# Patient Record
Sex: MALE | Race: WHITE | NOT HISPANIC OR LATINO | Employment: FULL TIME | URBAN - METROPOLITAN AREA
[De-identification: names, ages, dates, MRNs, and addresses within clinical notes are randomized per-mention and may not be internally consistent; named-entity substitution may affect disease eponyms.]

---

## 2017-10-10 ENCOUNTER — OFFICE VISIT (OUTPATIENT)
Dept: URGENT CARE | Facility: CLINIC | Age: 52
End: 2017-10-10
Payer: COMMERCIAL

## 2017-10-10 VITALS
RESPIRATION RATE: 24 BRPM | BODY MASS INDEX: 24.48 KG/M2 | DIASTOLIC BLOOD PRESSURE: 82 MMHG | SYSTOLIC BLOOD PRESSURE: 122 MMHG | OXYGEN SATURATION: 93 % | TEMPERATURE: 98.3 F | HEART RATE: 67 BPM | HEIGHT: 67 IN | WEIGHT: 156 LBS

## 2017-10-10 DIAGNOSIS — J06.9 UPPER RESPIRATORY TRACT INFECTION, UNSPECIFIED TYPE: ICD-10-CM

## 2017-10-10 PROCEDURE — 99203 OFFICE O/P NEW LOW 30 MIN: CPT | Performed by: PHYSICIAN ASSISTANT

## 2017-10-10 RX ORDER — AZITHROMYCIN 250 MG/1
TABLET, FILM COATED ORAL
Qty: 6 TAB | Refills: 0 | Status: SHIPPED | OUTPATIENT
Start: 2017-10-10 | End: 2020-06-20

## 2017-10-10 RX ORDER — ALBUTEROL SULFATE 90 UG/1
2 AEROSOL, METERED RESPIRATORY (INHALATION) EVERY 6 HOURS PRN
Qty: 8.5 G | Refills: 0 | Status: SHIPPED | OUTPATIENT
Start: 2017-10-10 | End: 2024-03-08

## 2017-10-10 RX ORDER — BENZONATATE 100 MG/1
100 CAPSULE ORAL 3 TIMES DAILY PRN
Qty: 60 CAP | Refills: 0 | Status: SHIPPED | OUTPATIENT
Start: 2017-10-10 | End: 2020-06-20

## 2017-10-10 RX ORDER — ALBUTEROL SULFATE 2.5 MG/3ML
2.5 SOLUTION RESPIRATORY (INHALATION) EVERY 4 HOURS PRN
Qty: 30 BULLET | Refills: 0 | Status: SHIPPED | OUTPATIENT
Start: 2017-10-10 | End: 2024-03-08

## 2017-10-10 ASSESSMENT — ENCOUNTER SYMPTOMS
HEMOPTYSIS: 0
MUSCULOSKELETAL NEGATIVE: 1
FEVER: 0
SPUTUM PRODUCTION: 1
DIZZINESS: 0
HEADACHES: 0
NAUSEA: 0
DIARRHEA: 0
WHEEZING: 1
SORE THROAT: 0
VOMITING: 0
ABDOMINAL PAIN: 0
COUGH: 1
CHILLS: 0
SHORTNESS OF BREATH: 0

## 2017-10-10 ASSESSMENT — COPD QUESTIONNAIRES: COPD: 0

## 2017-10-10 NOTE — PROGRESS NOTES
"Subjective:      Oj Lewis is a 52 y.o. male who presents with Cough and Wheezing            Cough   This is a new problem. The current episode started 1 to 4 weeks ago (2 weeks). The problem has been unchanged. The problem occurs every few minutes. The cough is productive of sputum. Associated symptoms include wheezing. Pertinent negatives include no chest pain, chills, ear pain, fever, headaches, hemoptysis, sore throat or shortness of breath. The symptoms are aggravated by lying down. He has tried nothing for the symptoms. His past medical history is significant for asthma and pneumonia. There is no history of COPD.   Wheezing    Associated symptoms include coughing and sputum production. Pertinent negatives include no abdominal pain, chest pain, chills, diarrhea, ear pain, fever, headaches, hemoptysis, shortness of breath, sore throat or vomiting. His past medical history is significant for asthma and pneumonia. There is no history of COPD.   Patient has been using his nebulizer at home, with some relief of wheezing symptoms. No recent use of antibiotics within the past 3 months. No known sick contacts.     Review of Systems   Constitutional: Negative for chills and fever.   HENT: Negative for ear pain and sore throat.    Respiratory: Positive for cough, sputum production and wheezing. Negative for hemoptysis and shortness of breath.    Cardiovascular: Negative for chest pain.   Gastrointestinal: Negative for abdominal pain, diarrhea, nausea and vomiting.   Genitourinary: Negative.    Musculoskeletal: Negative.    Neurological: Negative for dizziness and headaches.        Objective:     /82   Pulse 67   Temp 36.8 °C (98.3 °F)   Resp (!) 24   Ht 1.702 m (5' 7\")   Wt 70.8 kg (156 lb)   SpO2 93%   BMI 24.43 kg/m²      Physical Exam   Constitutional: He is oriented to person, place, and time. He appears well-developed and well-nourished. No distress.   HENT:   Head: Normocephalic and " atraumatic.   Right Ear: Hearing, tympanic membrane, external ear and ear canal normal.   Left Ear: Hearing, tympanic membrane, external ear and ear canal normal.   Mouth/Throat: Oropharynx is clear and moist. No oropharyngeal exudate, posterior oropharyngeal edema or posterior oropharyngeal erythema.   Eyes: Pupils are equal, round, and reactive to light.   Neck: Normal range of motion.   Cardiovascular: Normal rate, regular rhythm and normal heart sounds.    No murmur heard.  Pulmonary/Chest: Effort normal and breath sounds normal. No respiratory distress. He has no wheezes. He has no rales.   Musculoskeletal: Normal range of motion.   Neurological: He is alert and oriented to person, place, and time.   Skin: Skin is warm and dry. He is not diaphoretic.   Psychiatric: He has a normal mood and affect. His behavior is normal.   Nursing note and vitals reviewed.         PMH:  has no past medical history on file.  MEDS:   Current Outpatient Prescriptions:   •  azithromycin (ZITHROMAX) 250 MG Tab, Take 2 tablets on day one, then 1 tablet on days two through five, Disp: 6 Tab, Rfl: 0  •  benzonatate (TESSALON) 100 MG Cap, Take 1 Cap by mouth 3 times a day as needed for Cough., Disp: 60 Cap, Rfl: 0  •  albuterol (PROVENTIL) 2.5mg/3ml Nebu Soln solution for nebulization, 3 mL by Nebulization route every four hours as needed for Shortness of Breath., Disp: 30 Bullet, Rfl: 0  •  albuterol 108 (90 Base) MCG/ACT Aero Soln inhalation aerosol, Inhale 2 Puffs by mouth every 6 hours as needed for Shortness of Breath., Disp: 8.5 g, Rfl: 0  •  Mometasone Furo-Formoterol Fum (DULERA) 200-5 MCG/ACT AERO, Inhale 1 Puff by mouth 2 Times a Day., Disp: , Rfl:   •  cetirizine (ZYRTEC) 10 MG TABS, Take 10 mg by mouth every day., Disp: , Rfl:   •  aspirin (ASA) 325 MG TABS, Take 1 Tab by mouth every day., Disp: 100 Tab, Rfl: 0  ALLERGIES: No Known Allergies  SURGHX: No past surgical history on file.  SOCHX:  reports that he has never  smoked. He has never used smokeless tobacco. He reports that he does not drink alcohol or use drugs.  FH: family history is not on file.       Assessment/Plan:     1. Upper respiratory tract infection, unspecified type  - azithromycin (ZITHROMAX) 250 MG Tab; Take 2 tablets on day one, then 1 tablet on days two through five  Dispense: 6 Tab; Refill: 0   - Complete full course of antibiotics as prescribed   - benzonatate (TESSALON) 100 MG Cap; Take 1 Cap by mouth 3 times a day as needed for Cough.  Dispense: 60 Cap; Refill: 0  - albuterol (PROVENTIL) 2.5mg/3ml Nebu Soln solution for nebulization; 3 mL by Nebulization route every four hours as needed for Shortness of Breath.  Dispense: 30 Bullet; Refill: 0  - albuterol 108 (90 Base) MCG/ACT Aero Soln inhalation aerosol; Inhale 2 Puffs by mouth every 6 hours as needed for Shortness of Breath.  Dispense: 8.5 g; Refill: 0    Call or return to office if symptoms persist or worsen, would recommend CXR at that time.The patient demonstrated a good understanding and agreed with the treatment plan.

## 2017-10-20 ENCOUNTER — OFFICE VISIT (OUTPATIENT)
Dept: URGENT CARE | Facility: PHYSICIAN GROUP | Age: 52
End: 2017-10-20
Payer: COMMERCIAL

## 2017-10-20 VITALS
DIASTOLIC BLOOD PRESSURE: 80 MMHG | WEIGHT: 156 LBS | BODY MASS INDEX: 24.48 KG/M2 | HEART RATE: 68 BPM | RESPIRATION RATE: 16 BRPM | OXYGEN SATURATION: 97 % | SYSTOLIC BLOOD PRESSURE: 116 MMHG | TEMPERATURE: 98.5 F | HEIGHT: 67 IN

## 2017-10-20 DIAGNOSIS — J45.901 MODERATE ASTHMA WITH EXACERBATION, UNSPECIFIED WHETHER PERSISTENT: ICD-10-CM

## 2017-10-20 PROCEDURE — 99214 OFFICE O/P EST MOD 30 MIN: CPT | Performed by: PHYSICIAN ASSISTANT

## 2017-10-20 RX ORDER — METHYLPREDNISOLONE 4 MG/1
4 TABLET ORAL DAILY
Qty: 1 KIT | Refills: 0 | Status: SHIPPED | OUTPATIENT
Start: 2017-10-20 | End: 2020-06-20

## 2017-10-20 ASSESSMENT — ENCOUNTER SYMPTOMS
CONSTITUTIONAL NEGATIVE: 1
PSYCHIATRIC NEGATIVE: 1
SORE THROAT: 0
WHEEZING: 1
HEADACHES: 0
DIZZINESS: 0
SHORTNESS OF BREATH: 1
COUGH: 1
CARDIOVASCULAR NEGATIVE: 1
GASTROINTESTINAL NEGATIVE: 1
MUSCULOSKELETAL NEGATIVE: 1
EYES NEGATIVE: 1

## 2017-10-20 NOTE — PATIENT INSTRUCTIONS
Start steroid taper.  Qvar steroid inhaler once done, as needed.  Continue with albuterol inhaler and nebulizer.  Lots of fluids.  Humidifier at bedtime.  Don med sinus rinse or netti pot.

## 2017-10-20 NOTE — PROGRESS NOTES
Subjective:      Oj Lewis is a 52 y.o. male who presents with Cough (onset 3 weeks )            HPI  Chief Complaint   Patient presents with   • Cough     onset 3 weeks        HPI:  Oj Lewis is a 52 y.o. male who presents with cough x 3 weeks.  Was tx with azithromycin 10 days ago.   Nebulizer is helping. Cough is worsening throughout the day.  Hx of asthma.  Has taken Singulair in past.  Also had steroid inhaler in past. Feeling wheezy.  Non productive cough.  No sinus drainage at this time, azithromycin helped. Patient denies HA,  chest pain, palpitations, fever, chills, or n/v/d.      History reviewed. No pertinent past medical history.    History reviewed. No pertinent surgical history.    History reviewed. No pertinent family history.  No pertinent family history.    Social History     Social History   • Marital status:      Spouse name: N/A   • Number of children: N/A   • Years of education: N/A     Occupational History   • Not on file.     Social History Main Topics   • Smoking status: Never Smoker   • Smokeless tobacco: Never Used   • Alcohol use No   • Drug use: No   • Sexual activity: Not on file     Other Topics Concern   • Not on file     Social History Narrative   • No narrative on file         Current Outpatient Prescriptions:   •  albuterol, 2.5 mg, Nebulization, Q4HRS PRN  •  albuterol, 2 Puff, Inhalation, Q6HRS PRN  •  cetirizine, 10 mg, Oral, DAILY  •  azithromycin, Take 2 tablets on day one, then 1 tablet on days two through five  •  benzonatate, 100 mg, Oral, TID PRN  •  Mometasone Furo-Formoterol Fum, 1 Puff, Inhalation, BID, 8/29/2013 at Unknown  •  aspirin, 325 mg, Oral, DAILY    No Known Allergies     Review of Systems   Constitutional: Negative.    HENT: Positive for congestion. Negative for sore throat.    Eyes: Negative.    Respiratory: Positive for cough, shortness of breath and wheezing.    Cardiovascular: Negative.    Gastrointestinal: Negative.   "  Genitourinary: Negative.    Musculoskeletal: Negative.    Skin: Negative.    Neurological: Negative for dizziness and headaches.   Endo/Heme/Allergies: Negative.    Psychiatric/Behavioral: Negative.           Objective:     /80   Pulse 68   Temp 36.9 °C (98.5 °F)   Resp 16   Ht 1.702 m (5' 7\")   Wt 70.8 kg (156 lb)   SpO2 97%   BMI 24.43 kg/m²      Physical Exam       Nursing note and vitals reviewed.    Constitutional:   Appropriately groomed, pleasant affect, well nourished, in NAD.    Head:   Normocephalic, atraumatic.    Eyes:   PERRLA, EOM's full, sclera white, conjunctiva not erythematous, and medial canthus without exudate bilaterally.    Ears:  Auricle and tragus non-tender to manipulation.  No pre-auricular lymphadenopathy or mastoid ttp.  EACs with mild cerumen bilaterally, not erythematous.  TM’s pearly gray with cone of light present and umbo and malleolus visible bilaterally.  No bulging or fluid bubbles present in middle ear.  Hearing grossly intact to voice.    Nose:  Nares not patent bilaterally.  Nasal mucosa edematous with white rhinorrhea bilaterally.  Mild sinus tenderness to percussion.    Throat:  Dentition wnl, mucosa moist without lesions.  Oropharynx mildly erythematous, with no enlargement of the palatine tonsils bilaterally with no exudates.    Post nasal drainage  present.  Soft palate rises symmetrically bilaterally and uvula midline.      Neck: Neck supple, with mild anterior lymphadenopathy that is soft and mobile to palpation. Thyroid non-palpable without tenderness or nodules. No supraclavicular lymphadenopathy.    Lungs:  Respiratory effort not labored without accessory muscle use.  Lungs with inspiratory wheezes to auscultation. No rales. Rhonchi cleared with cough.    Heart:  RRR, without murmurs rubs or gallops.  Radial and dorsalis pedis pulse 2+ bilaterally.  No LE edema.    Musculoskeletal:  Gait non-antalgic with a narrow base.    Derm:  Skin without rashes or " lesions with good turgor pressure.      Psychiatric:  Mood, affect, and judgement appropriate.         Assessment/Plan:     1. Moderate asthma with exacerbation, unspecified whether persistent  Patient presents with asthma exacerbation likely due to post nasal drip component.  On exam no evidence of BRS but inspiratory wheezes to ascultation throughout.  Rx medrol dosepak and qvar inhaler for use thereafter.  Advised establishing with PCP for further discussion of singulair, etc.  Reviewed s/s measures and advised to use nighttime humidifier.    Patient was in agreement with this treatment plan and seemed to understand without barriers. All questions were encouraged and answered.  Reviewed signs and symptoms of when to seek emergency medical care.     Please note that this dictation was created using voice recognition software.  I have made every reasonable attempt to correct obvious errors, but I expect there are errors of emani and possibly content that I did not discover before finalizing the note.     - MethylPREDNISolone (MEDROL DOSEPAK) 4 MG Tablet Therapy Pack; Take 1 Tab by mouth every day.  Dispense: 1 Kit; Refill: 0  - beclomethasone (QVAR) 80 MCG/ACT inhaler; Inhale 1 Puff by mouth 2 times a day. Rinse and spit after each use  Dispense: 7.3 g; Refill: 0

## 2020-06-20 ENCOUNTER — OFFICE VISIT (OUTPATIENT)
Dept: URGENT CARE | Facility: PHYSICIAN GROUP | Age: 55
End: 2020-06-20
Payer: COMMERCIAL

## 2020-06-20 VITALS
OXYGEN SATURATION: 96 % | SYSTOLIC BLOOD PRESSURE: 132 MMHG | HEART RATE: 66 BPM | HEIGHT: 67 IN | RESPIRATION RATE: 16 BRPM | DIASTOLIC BLOOD PRESSURE: 78 MMHG | BODY MASS INDEX: 25.11 KG/M2 | TEMPERATURE: 97.3 F | WEIGHT: 160 LBS

## 2020-06-20 DIAGNOSIS — H69.91 EUSTACHIAN TUBE DYSFUNCTION, RIGHT: ICD-10-CM

## 2020-06-20 DIAGNOSIS — H93.8X1 SENSATION OF FULLNESS IN RIGHT EAR: ICD-10-CM

## 2020-06-20 DIAGNOSIS — J30.9 ALLERGIC RHINITIS, UNSPECIFIED SEASONALITY, UNSPECIFIED TRIGGER: ICD-10-CM

## 2020-06-20 DIAGNOSIS — H61.21 IMPACTED CERUMEN OF RIGHT EAR: ICD-10-CM

## 2020-06-20 PROCEDURE — 99214 OFFICE O/P EST MOD 30 MIN: CPT | Performed by: NURSE PRACTITIONER

## 2020-06-20 RX ORDER — MONTELUKAST SODIUM 10 MG/1
TABLET ORAL
COMMUNITY
Start: 2020-04-16

## 2020-06-20 RX ORDER — TRIAMCINOLONE ACETONIDE 40 MG/ML
40 INJECTION, SUSPENSION INTRA-ARTICULAR; INTRAMUSCULAR ONCE
Status: COMPLETED | OUTPATIENT
Start: 2020-06-20 | End: 2020-06-20

## 2020-06-20 RX ORDER — FLUTICASONE PROPIONATE AND SALMETEROL XINAFOATE 115; 21 UG/1; UG/1
AEROSOL, METERED RESPIRATORY (INHALATION)
COMMUNITY
Start: 2020-06-14 | End: 2021-04-30

## 2020-06-20 RX ORDER — BECLOMETHASONE DIPROPIONATE HFA 80 UG/1
AEROSOL, METERED RESPIRATORY (INHALATION)
COMMUNITY
Start: 2020-03-28

## 2020-06-20 RX ADMIN — TRIAMCINOLONE ACETONIDE 40 MG: 40 INJECTION, SUSPENSION INTRA-ARTICULAR; INTRAMUSCULAR at 11:56

## 2020-06-20 ASSESSMENT — ENCOUNTER SYMPTOMS
FEVER: 0
CONSTITUTIONAL NEGATIVE: 1
SHORTNESS OF BREATH: 0
RESPIRATORY NEGATIVE: 1
CHILLS: 0

## 2020-06-20 ASSESSMENT — VISUAL ACUITY: OU: 1

## 2020-06-20 NOTE — PATIENT INSTRUCTIONS
A referral request has been placed for primary care. We have a referrals department that is tasked with locating a suitable office that currently accepts patients and your insurance and you should be receiving referral information from them such as the office name, address, and number. This process usually takes around 3 business days. If you are not contacted by our referrals department, please call [phone].     You may also use any combination of the following over-the-counter medications per 's instructions:  - sinus rinse kits/nasal saline sprays  - nasal steroid sprays (e.g. Flonase, Nasacort)  - oral daily antihistamine (e.g. Claritin, Zyrtec)  - oral decongestant (e.g. Sudafed)      Eustachian Tube Dysfunction  Introduction  The eustachian tube connects the middle ear to the back of the nose. It regulates air pressure in the middle ear by allowing air to move between the ear and nose. It also helps to drain fluid from the middle ear space. When the eustachian tube does not function properly, air pressure, fluid, or both can build up in the middle ear.  Eustachian tube dysfunction can affect one or both ears.  What are the causes?  This condition happens when the eustachian tube becomes blocked or cannot open normally. This may result from:  · Ear infections.  · Colds and other upper respiratory infections.  · Allergies.  · Irritation, such as from cigarette smoke or acid from the stomach coming up into the esophagus (gastroesophageal reflux).  · Sudden changes in air pressure, such as from descending in an airplane.  · Abnormal growths in the nose or throat, such as nasal polyps, tumors, or enlarged tissue at the back of the throat (adenoids).  What increases the risk?  This condition may be more likely to develop in people who smoke and people who are overweight. Eustachian tube dysfunction may also be more likely to develop in children, especially children who have:  · Certain birth  "defects of the mouth, such as cleft palate.  · Large tonsils and adenoids.  What are the signs or symptoms?  Symptoms of this condition may include:  · A feeling of fullness in the ear.  · Ear pain.  · Clicking or popping noises in the ear.  · Ringing in the ear.  · Hearing loss.  · Loss of balance.  Symptoms may get worse when the air pressure around you changes, such as when you travel to an area of high elevation or fly on an airplane.  How is this diagnosed?  This condition may be diagnosed based on:  · Your symptoms.  · A physical exam of your ear, nose, and throat.  · Tests, such as those that measure:  ¨ The movement of your eardrum (tympanogram).  ¨ Your hearing (audiometry).  How is this treated?  Treatment depends on the cause and severity of your condition. If your symptoms are mild, you may be able to relieve your symptoms by moving air into (\"popping\") your ears. If you have symptoms of fluid in your ears, treatment may include:  · Decongestants.  · Antihistamines.  · Nasal sprays or ear drops that contain medicines that reduce swelling (steroids).  In some cases, you may need to have a procedure to drain the fluid in your eardrum (myringotomy). In this procedure, a small tube is placed in the eardrum to:  · Drain the fluid.  · Restore the air in the middle ear space.  Follow these instructions at home:  · Take over-the-counter and prescription medicines only as told by your health care provider.  · Use techniques to help pop your ears as recommended by your health care provider. These may include:  ¨ Chewing gum.  ¨ Yawning.  ¨ Frequent, forceful swallowing.  ¨ Closing your mouth, holding your nose closed, and gently blowing as if you are trying to blow air out of your nose.  · Do not do any of the following until your health care provider approves:  ¨ Travel to high altitudes.  ¨ Fly in airplanes.  ¨ Work in a pressurized cabin or room.  ¨ Scuba dive.  · Keep your ears dry. Dry your ears completely " after showering or bathing.  · Do not smoke.  · Keep all follow-up visits as told by your health care provider. This is important.  Contact a health care provider if:  · Your symptoms do not go away after treatment.  · Your symptoms come back after treatment.  · You are unable to pop your ears.  · You have:  ¨ A fever.  ¨ Pain in your ear.  ¨ Pain in your head or neck.  ¨ Fluid draining from your ear.  · Your hearing suddenly changes.  · You become very dizzy.  · You lose your balance.  This information is not intended to replace advice given to you by your health care provider. Make sure you discuss any questions you have with your health care provider.  Document Released: 01/13/2017 Document Revised: 05/25/2017 Document Reviewed: 01/06/2016  © 2017 [employer]

## 2020-06-20 NOTE — PROGRESS NOTES
Subjective:     Oj Lewis is a 55 y.o. male who presents for Ear Fullness (had it last month and is not going away, sometimes can hear fluid moving )       Ear Fullness   This is a new problem. Episode onset: 1 month ago. The problem has been gradually worsening. Associated symptoms include congestion. Pertinent negatives include no chills or fever. Associated symptoms comments: Right ear pressure, decreased hearing, fluid sensation; postnasal drip. Treatments tried: Singulair, Advair, Aller-Lui.     History of asthma and environmental allergies.    PMH:  has no past medical history on file.    MEDS:   Current Outpatient Medications:   •  ADVAIR -21 MCG/ACT inhaler, , Disp: , Rfl:   •  montelukast (SINGULAIR) 10 MG Tab, , Disp: , Rfl:   •  albuterol (PROVENTIL) 2.5mg/3ml Nebu Soln solution for nebulization, 3 mL by Nebulization route every four hours as needed for Shortness of Breath., Disp: 30 Bullet, Rfl: 0  •  albuterol 108 (90 Base) MCG/ACT Aero Soln inhalation aerosol, Inhale 2 Puffs by mouth every 6 hours as needed for Shortness of Breath., Disp: 8.5 g, Rfl: 0  •  cetirizine (ZYRTEC) 10 MG TABS, Take 10 mg by mouth every day., Disp: , Rfl:   •  QVAR REDIHALER 80 MCG/ACT inhaler, , Disp: , Rfl:   •  Mometasone Furo-Formoterol Fum (DULERA) 200-5 MCG/ACT AERO, Inhale 1 Puff by mouth 2 Times a Day., Disp: , Rfl:     ALLERGIES: No Known Allergies    SURGHX: History reviewed. No pertinent surgical history.    SOCHX:  reports that he has never smoked. He has never used smokeless tobacco. He reports that he does not drink alcohol or use drugs.     FH: Reviewed with patient, not pertinent to this visit.    Review of Systems   Constitutional: Negative.  Negative for chills, fever and malaise/fatigue.   HENT: Positive for congestion and hearing loss. Negative for ear pain.    Respiratory: Negative.  Negative for shortness of breath.    Endo/Heme/Allergies: Positive for environmental allergies.   All  "other systems reviewed and are negative.    Additional details per HPI.      Objective:     /78 (BP Location: Right arm, Patient Position: Sitting, BP Cuff Size: Adult)   Pulse 66   Temp 36.3 °C (97.3 °F) (Tympanic)   Resp 16   Ht 1.702 m (5' 7\")   Wt 72.6 kg (160 lb)   SpO2 96%   BMI 25.06 kg/m²     Physical Exam  Vitals signs reviewed.   Constitutional:       General: He is not in acute distress.     Appearance: He is well-developed. He is not toxic-appearing.   HENT:      Head: Normocephalic.      Right Ear: External ear normal. There is impacted cerumen.      Left Ear: Tympanic membrane and external ear normal.   Eyes:      General: Vision grossly intact.      Extraocular Movements: Extraocular movements intact.      Conjunctiva/sclera: Conjunctivae normal.   Neck:      Musculoskeletal: Normal range of motion.   Cardiovascular:      Rate and Rhythm: Normal rate.   Pulmonary:      Effort: Pulmonary effort is normal. No respiratory distress.   Musculoskeletal: Normal range of motion.         General: No deformity.   Skin:     General: Skin is warm and dry.      Coloration: Skin is not pale.   Neurological:      Mental Status: He is alert and oriented to person, place, and time.      Sensory: No sensory deficit.      Motor: No weakness.      Coordination: Coordination normal.   Psychiatric:         Behavior: Behavior normal. Behavior is cooperative.       Assessment/Plan:     1. Sensation of fullness in right ear  - REFERRAL TO FAMILY PRACTICE    2. Eustachian tube dysfunction, right  - REFERRAL TO FAMILY PRACTICE    3. Impacted cerumen of right ear  - REFERRAL TO FAMILY PRACTICE    4. Allergic rhinitis, unspecified seasonality, unspecified trigger  - REFERRAL TO FAMILY PRACTICE  - triamcinolone acetonide (KENALOG-40) injection 40 mg    Ear lavage was performed in right auditory canal with large amount of cerumen removed. Patient tolerated well. No complications. Re-examination reveals auditory canal " clear of cerumen. TMs intact with no erythema, bulging, or perforation. Pt reports hearing has improved in right ear, but not completely.    Patient requesting Kenalog shot which he has had/tolerated in the past for severe allergies. Last received 3 years ago. Kenalog IM given in clinic today. Patient tolerated well.    May also use any combination of the following over-the-counter medications per 's instructions:  - sinus rinse kits/nasal saline sprays  - nasal steroid sprays (e.g. Flonase, Nasacort)  - oral daily antihistamine (e.g. Claritin, Zyrtec)  - oral decongestant (e.g. Sudafed)    Patient needs a PCP. Referral for family practice entered.    Differential diagnosis, natural history, supportive care, over-the-counter symptom management per 's instructions, close monitoring, and indications for immediate follow-up discussed.     Warning signs reviewed. Return precautions discussed.     Patient advised to: Return for 1) Symptoms don't improve or worsen, or go to ER, 2) Follow up with primary care in 7-10 days.    All questions answered. Patient agrees with the plan of care.    Discharge summary provided.

## 2021-03-15 DIAGNOSIS — Z23 NEED FOR VACCINATION: ICD-10-CM

## 2021-04-30 ENCOUNTER — OFFICE VISIT (OUTPATIENT)
Dept: URGENT CARE | Facility: CLINIC | Age: 56
End: 2021-04-30
Payer: COMMERCIAL

## 2021-04-30 VITALS
BODY MASS INDEX: 26.07 KG/M2 | SYSTOLIC BLOOD PRESSURE: 102 MMHG | DIASTOLIC BLOOD PRESSURE: 60 MMHG | TEMPERATURE: 97 F | WEIGHT: 162.2 LBS | OXYGEN SATURATION: 95 % | HEART RATE: 66 BPM | RESPIRATION RATE: 18 BRPM | HEIGHT: 66 IN

## 2021-04-30 DIAGNOSIS — J45.41 MODERATE PERSISTENT ASTHMA WITH EXACERBATION: ICD-10-CM

## 2021-04-30 PROCEDURE — 99213 OFFICE O/P EST LOW 20 MIN: CPT | Performed by: FAMILY MEDICINE

## 2021-04-30 RX ORDER — AZITHROMYCIN 250 MG/1
TABLET, FILM COATED ORAL
Qty: 6 TABLET | Refills: 0 | Status: SHIPPED | OUTPATIENT
Start: 2021-04-30 | End: 2024-03-08

## 2021-04-30 RX ORDER — PREDNISONE 20 MG/1
40 TABLET ORAL DAILY
Qty: 10 TABLET | Refills: 0 | Status: SHIPPED | OUTPATIENT
Start: 2021-04-30 | End: 2021-05-05

## 2021-04-30 ASSESSMENT — ENCOUNTER SYMPTOMS
SORE THROAT: 0
VOMITING: 0
SHORTNESS OF BREATH: 1
FEVER: 0
WHEEZING: 1

## 2021-04-30 NOTE — PROGRESS NOTES
Subjective:     Oj Lewis is a 56 y.o. male who presents for Seasonal Allergies (10x days ago, inhaler 3x day, sob)    HPI  Pt presents for evaluation of an acute problem  Patient with increased allergies  the past 10 days  Has been needing to use his inhaler more the past 3 days  Feeling more short of breath each day  Albuterol inhaler does help, however only for a short time  Normally uses Qvar 80 as daily controller and has been working well  Also takes cetirizine and montelukast daily  Feels he has been wheezing more  Does not feel ill, has no myalgias, fever, or cough    Review of Systems   Constitutional: Negative for fever.   HENT: Negative for sore throat.    Respiratory: Positive for shortness of breath and wheezing.    Gastrointestinal: Negative for vomiting.   Skin: Negative for rash.     PMH:  has no past medical history on file.  MEDS:   Current Outpatient Medications:   •  ADVAIR -21 MCG/ACT inhaler, , Disp: , Rfl:   •  montelukast (SINGULAIR) 10 MG Tab, , Disp: , Rfl:   •  QVAR REDIHALER 80 MCG/ACT inhaler, , Disp: , Rfl:   •  albuterol (PROVENTIL) 2.5mg/3ml Nebu Soln solution for nebulization, 3 mL by Nebulization route every four hours as needed for Shortness of Breath., Disp: 30 Bullet, Rfl: 0  •  albuterol 108 (90 Base) MCG/ACT Aero Soln inhalation aerosol, Inhale 2 Puffs by mouth every 6 hours as needed for Shortness of Breath., Disp: 8.5 g, Rfl: 0  •  Mometasone Furo-Formoterol Fum (DULERA) 200-5 MCG/ACT AERO, Inhale 1 Puff by mouth 2 Times a Day., Disp: , Rfl:   •  cetirizine (ZYRTEC) 10 MG TABS, Take 10 mg by mouth every day., Disp: , Rfl:   ALLERGIES: No Known Allergies  SURGHX: No past surgical history on file.  SOCHX:  reports that he has never smoked. He has never used smokeless tobacco. He reports that he does not drink alcohol and does not use drugs.     Objective:   /60 (BP Location: Left arm, Patient Position: Sitting, BP Cuff Size: Adult)   Pulse 66   Temp  "36.1 °C (97 °F) (Temporal)   Resp 18   Ht 1.676 m (5' 6\")   Wt 73.6 kg (162 lb 3.2 oz)   SpO2 95%   BMI 26.18 kg/m²     Physical Exam  Constitutional:       General: He is not in acute distress.     Appearance: He is well-developed. He is not diaphoretic.   HENT:      Head: Normocephalic and atraumatic.   Neck:      Trachea: No tracheal deviation.   Cardiovascular:      Rate and Rhythm: Normal rate and regular rhythm.      Heart sounds: Normal heart sounds.   Pulmonary:      Comments: Breathing comfortably on room air, speaks in full sentences, no increased work of breathing, moderately decreased air movement throughout and equal, diffuse wheezing and rhonchi throughout, no focal rales or focal areas of decreased air movement.  Musculoskeletal:         General: Normal range of motion.   Skin:     General: Skin is warm and dry.      Findings: No rash.   Neurological:      Mental Status: He is alert.   Psychiatric:         Behavior: Behavior normal.         Thought Content: Thought content normal.         Judgment: Judgment normal.       Assessment/Plan:   Assessment    1. Moderate persistent asthma with exacerbation  - azithromycin (ZITHROMAX) 250 MG Tab; Take 2 tabs PO first day, then take 1 tab PO daily x 4 days  Dispense: 6 tablet; Refill: 0  - predniSONE (DELTASONE) 20 MG Tab; Take 2 Tablets by mouth every day for 5 days.  Dispense: 10 tablet; Refill: 0    Patient with acute asthma exacerbation, will treat with antibiotics and steroids.  Did not recommend modifying his daily controller inhaler today as he has been doing well with this in the past.  Did highly recommend him to obtain a new PCP and establish care soon.  We will follow-up with new PCP.  "

## 2024-03-08 ENCOUNTER — OFFICE VISIT (OUTPATIENT)
Dept: URGENT CARE | Facility: CLINIC | Age: 59
End: 2024-03-08
Payer: COMMERCIAL

## 2024-03-08 VITALS
HEIGHT: 67 IN | DIASTOLIC BLOOD PRESSURE: 58 MMHG | SYSTOLIC BLOOD PRESSURE: 112 MMHG | BODY MASS INDEX: 26.21 KG/M2 | HEART RATE: 58 BPM | WEIGHT: 167 LBS | TEMPERATURE: 98.5 F | RESPIRATION RATE: 14 BRPM | OXYGEN SATURATION: 93 %

## 2024-03-08 DIAGNOSIS — H69.91 ETD (EUSTACHIAN TUBE DYSFUNCTION), RIGHT: ICD-10-CM

## 2024-03-08 DIAGNOSIS — H93.8X1 EAR FULLNESS, RIGHT: ICD-10-CM

## 2024-03-08 DIAGNOSIS — J45.901 EXACERBATION OF ASTHMA, UNSPECIFIED ASTHMA SEVERITY, UNSPECIFIED WHETHER PERSISTENT: ICD-10-CM

## 2024-03-08 PROCEDURE — 99214 OFFICE O/P EST MOD 30 MIN: CPT | Performed by: NURSE PRACTITIONER

## 2024-03-08 PROCEDURE — 3078F DIAST BP <80 MM HG: CPT | Performed by: NURSE PRACTITIONER

## 2024-03-08 PROCEDURE — 3074F SYST BP LT 130 MM HG: CPT | Performed by: NURSE PRACTITIONER

## 2024-03-08 RX ORDER — PREDNISONE 20 MG/1
TABLET ORAL
Qty: 11 TABLET | Refills: 0 | Status: SHIPPED | OUTPATIENT
Start: 2024-03-08

## 2024-03-08 RX ORDER — TRIAMCINOLONE ACETONIDE 55 UG/1
2 SPRAY, METERED NASAL DAILY
COMMUNITY
End: 2024-03-08

## 2024-03-08 RX ORDER — PREDNISONE 10 MG/1
TABLET ORAL
COMMUNITY
Start: 2024-01-10 | End: 2024-03-08

## 2024-03-08 RX ORDER — ALBUTEROL SULFATE 90 UG/1
1-2 AEROSOL, METERED RESPIRATORY (INHALATION) EVERY 4 HOURS PRN
Qty: 8 G | Refills: 0 | Status: SHIPPED | OUTPATIENT
Start: 2024-03-08

## 2024-03-08 ASSESSMENT — VISUAL ACUITY: OU: 1

## 2024-03-08 ASSESSMENT — ENCOUNTER SYMPTOMS
SENSORY CHANGE: 0
SINUS PAIN: 0
CONSTITUTIONAL NEGATIVE: 1
WEAKNESS: 0
HEADACHES: 1
DIZZINESS: 0
COUGH: 0
SHORTNESS OF BREATH: 1
FEVER: 0
BLURRED VISION: 0

## 2024-03-08 NOTE — PROGRESS NOTES
Subjective:     Oj Lewis is a 59 y.o. male who presents for Ear Fullness (Right ear feels plugged x 10 days breathing is wheezy)       Ear Fullness  This is a new problem. The problem has been gradually worsening. Associated symptoms include congestion and headaches. Pertinent negatives include no coughing, fever, rash or weakness.   Shortness of Breath  This is a new problem. The problem has been gradually worsening. Associated symptoms include headaches. Pertinent negatives include no ear pain, fever or rash.     Patient reporting 10 days of decreased hearing at the right ear.  Denies pain.  Reports tilting his head to the right sometimes improves the symptoms.  However, the decreased hearing would come back.  Reports a sensation of numbness outside his right ear.  Had a posterior headache.  Denies rash, sensory changes, vision change, weakness, or other symptoms.  Denies fever.    History of environmental allergies.  Takes cetirizine once a day as well as montelukast.    Also reporting a few shortness of breath the past 10 days.  History of asthma.  Uses Qvar inhaler.    Review of Systems   Constitutional: Negative.  Negative for fever and malaise/fatigue.   HENT:  Positive for congestion and hearing loss (Right). Negative for ear pain and sinus pain.    Eyes:  Negative for blurred vision.   Respiratory:  Positive for shortness of breath. Negative for cough.    Skin:  Negative for itching and rash.   Neurological:  Positive for headaches. Negative for dizziness, sensory change and weakness.   Endo/Heme/Allergies:  Positive for environmental allergies.   All other systems reviewed and are negative.    Refer to HPI for additional details.    During this visit, appropriate PPE was worn, and hand hygiene was performed.    PMH:  has no past medical history on file.    MEDS:   Current Outpatient Medications:     predniSONE (DELTASONE) 20 MG Tab, Take 3 tabs daily x 2 days, then 2 tabs daily x 2 days, then 1  "tab on final day., Disp: 11 Tablet, Rfl: 0    albuterol 108 (90 Base) MCG/ACT Aero Soln inhalation aerosol, Inhale 1-2 Puffs every four hours as needed for Shortness of Breath (and/or wheezing)., Disp: 8 g, Rfl: 0    montelukast (SINGULAIR) 10 MG Tab, , Disp: , Rfl:     QVAR REDIHALER 80 MCG/ACT inhaler, , Disp: , Rfl:     cetirizine (ZYRTEC) 10 MG TABS, Take 10 mg by mouth every day., Disp: , Rfl:     ALLERGIES: No Known Allergies  SURGHX: History reviewed. No pertinent surgical history.  SOCHX:  reports that he has never smoked. He has never used smokeless tobacco. He reports that he does not drink alcohol and does not use drugs.    FH: Per HPI as applicable/pertinent.      Objective:     /58 (BP Location: Left arm, Patient Position: Sitting, BP Cuff Size: Adult)   Pulse (!) 58   Temp 36.9 °C (98.5 °F) (Temporal)   Resp 14   Ht 1.702 m (5' 7\")   Wt 75.8 kg (167 lb)   SpO2 93%   BMI 26.16 kg/m²     Physical Exam  Nursing note reviewed.   Constitutional:       General: He is not in acute distress.     Appearance: He is well-developed. He is not ill-appearing or toxic-appearing.   HENT:      Right Ear: External ear normal. No drainage, swelling or tenderness. No mastoid tenderness. Tympanic membrane is not perforated, erythematous or bulging.      Left Ear: External ear normal. Tympanic membrane is not perforated, erythematous or bulging.      Ears:      Comments: Excess cerumen R EAC removed with curette, no complications     Nose: Nose normal.   Eyes:      General: Vision grossly intact.      Extraocular Movements: Extraocular movements intact.      Conjunctiva/sclera: Conjunctivae normal.   Cardiovascular:      Rate and Rhythm: Normal rate and regular rhythm.      Heart sounds: Normal heart sounds.   Pulmonary:      Effort: Pulmonary effort is normal. No respiratory distress.      Breath sounds: Wheezing (Bases) present. No decreased breath sounds.   Musculoskeletal:         General: No deformity. " Normal range of motion.      Cervical back: Normal range of motion.   Skin:     General: Skin is warm and dry.      Coloration: Skin is not pale.      Findings: No erythema, lesion or rash.   Neurological:      Mental Status: He is alert and oriented to person, place, and time.      GCS: GCS eye subscore is 4. GCS verbal subscore is 5. GCS motor subscore is 6.      Cranial Nerves: No dysarthria or facial asymmetry.      Motor: No weakness.      Gait: Gait normal.   Psychiatric:         Mood and Affect: Mood normal.         Behavior: Behavior normal. Behavior is cooperative.         Thought Content: Thought content normal.         Judgment: Judgment normal.       Assessment/Plan:     1. Ear fullness, right  - predniSONE (DELTASONE) 20 MG Tab; Take 3 tabs daily x 2 days, then 2 tabs daily x 2 days, then 1 tab on final day.  Dispense: 11 Tablet; Refill: 0    2. ETD (Eustachian tube dysfunction), right  - predniSONE (DELTASONE) 20 MG Tab; Take 3 tabs daily x 2 days, then 2 tabs daily x 2 days, then 1 tab on final day.  Dispense: 11 Tablet; Refill: 0    3. Exacerbation of asthma, unspecified asthma severity, unspecified whether persistent  - predniSONE (DELTASONE) 20 MG Tab; Take 3 tabs daily x 2 days, then 2 tabs daily x 2 days, then 1 tab on final day.  Dispense: 11 Tablet; Refill: 0  - albuterol 108 (90 Base) MCG/ACT Aero Soln inhalation aerosol; Inhale 1-2 Puffs every four hours as needed for Shortness of Breath (and/or wheezing).  Dispense: 8 g; Refill: 0    Rx as above sent electronically.    Continue cetirizine, montelukast, and Qvar.    Monitor. Warning signs reviewed. Return precautions advised.     Differential diagnosis, natural history, supportive care, over-the-counter symptom management per 's instructions, close monitoring, and indications for immediate follow-up discussed.     All questions answered. Patient agrees with the plan of care.    Billing note: chronic illness with  exacerbation/progression; prescription drug management. Established patient. 80323. Please refer to LOS tool for details.

## 2024-04-19 ENCOUNTER — OFFICE VISIT (OUTPATIENT)
Dept: URGENT CARE | Facility: CLINIC | Age: 59
End: 2024-04-19
Payer: COMMERCIAL

## 2024-04-19 VITALS
SYSTOLIC BLOOD PRESSURE: 104 MMHG | HEART RATE: 77 BPM | DIASTOLIC BLOOD PRESSURE: 66 MMHG | WEIGHT: 166 LBS | BODY MASS INDEX: 26.06 KG/M2 | OXYGEN SATURATION: 95 % | RESPIRATION RATE: 16 BRPM | HEIGHT: 67 IN | TEMPERATURE: 97.9 F

## 2024-04-19 DIAGNOSIS — Z91.09 ENVIRONMENTAL ALLERGIES: ICD-10-CM

## 2024-04-19 DIAGNOSIS — J45.901 EXACERBATION OF ASTHMA, UNSPECIFIED ASTHMA SEVERITY, UNSPECIFIED WHETHER PERSISTENT: ICD-10-CM

## 2024-04-19 PROBLEM — J45.909 ASTHMA: Status: ACTIVE | Noted: 2021-01-15

## 2024-04-19 PROCEDURE — 3074F SYST BP LT 130 MM HG: CPT | Performed by: PHYSICIAN ASSISTANT

## 2024-04-19 PROCEDURE — 3078F DIAST BP <80 MM HG: CPT | Performed by: PHYSICIAN ASSISTANT

## 2024-04-19 PROCEDURE — 99214 OFFICE O/P EST MOD 30 MIN: CPT | Performed by: PHYSICIAN ASSISTANT

## 2024-04-19 RX ORDER — TRIAMCINOLONE ACETONIDE 40 MG/ML
40 INJECTION, SUSPENSION INTRA-ARTICULAR; INTRAMUSCULAR ONCE
Status: COMPLETED | OUTPATIENT
Start: 2024-04-19 | End: 2024-04-19

## 2024-04-19 RX ADMIN — TRIAMCINOLONE ACETONIDE 40 MG: 40 INJECTION, SUSPENSION INTRA-ARTICULAR; INTRAMUSCULAR at 12:04

## 2024-04-19 NOTE — PROGRESS NOTES
"Subjective:   Oj Lewis is a 59 y.o. male who presents for Sore Throat (X5 days) and Sinus Problem (X5 days)      HPI  The patient presents to the Urgent Care with complaints of environmental allergies and asthma symptoms.  Symptoms started 6 days ago after working outside.  Reports of right ear fullness but no ear pain.  Reports of runny nose, nasal congestion, mild intermittent cough and occasional wheezing.  Cough is nonproductive.  He takes Qvar inhaler, albuterol inhaler, Zyrtec, and montelukast.  He states his symptoms have not improved and he is going on a work trip soon.  He is requesting a Kenalog shot.  He has tolerated this before and does help with his allergies.  It has been several years since his last Kenalog shot.  Denies any fever, chills, chest pain, SOB.  No history of immunocompromise diseases.  No history of diabetes          No past medical history on file.  No Known Allergies     Objective:     /66 (BP Location: Left arm, Patient Position: Sitting, BP Cuff Size: Adult)   Pulse 77   Temp 36.6 °C (97.9 °F) (Temporal)   Resp 16   Ht 1.702 m (5' 7\")   Wt 75.3 kg (166 lb)   SpO2 95%   BMI 26.00 kg/m²     Physical Exam  Vitals reviewed.   Constitutional:       General: He is not in acute distress.     Appearance: Normal appearance. He is not ill-appearing or toxic-appearing.   HENT:      Right Ear: Ear canal and external ear normal. A middle ear effusion is present. Tympanic membrane is not erythematous or bulging.      Left Ear: Ear canal and external ear normal.  No middle ear effusion. Tympanic membrane is not erythematous or bulging.      Nose: Congestion present.      Mouth/Throat:      Mouth: Mucous membranes are moist.      Pharynx: Oropharynx is clear. No oropharyngeal exudate or posterior oropharyngeal erythema.   Eyes:      Conjunctiva/sclera: Conjunctivae normal.   Cardiovascular:      Rate and Rhythm: Normal rate and regular rhythm.      Heart sounds: Normal heart " sounds.   Pulmonary:      Effort: Pulmonary effort is normal. No respiratory distress.      Breath sounds: Wheezing (sllight expiratory to left upper lung) present. No rhonchi or rales.   Musculoskeletal:      Cervical back: Neck supple.   Skin:     General: Skin is warm and dry.   Neurological:      General: No focal deficit present.      Mental Status: He is alert and oriented to person, place, and time.   Psychiatric:         Mood and Affect: Mood normal.         Behavior: Behavior normal.         Diagnosis and associated orders:     1. Environmental allergies  - triamcinolone acetonide (Kenalog-40) injection 40 mg    2. Exacerbation of asthma, unspecified asthma severity, unspecified whether persistent  - triamcinolone acetonide (Kenalog-40) injection 40 mg       Comments/MDM:     Patient treated with Kenalog 40 mg IM. Tolerated well. Continue allergy medications. Nasal saline washes. Flonase nasal spray. Avoid exacerbating factors.        I personally reviewed prior external notes and test results pertinent to today's visit. Pathogenesis of diagnosis discussed including typical length and natural progression. Supportive care, natural history, differential diagnoses, and indications for immediate follow-up discussed. Patient expresses understanding and agrees to plan. Patient denies any other questions or concerns.     Follow-up with the primary care physician for recheck, reevaluation, and consideration of further management.    Please note that this dictation was created using voice recognition software. I have made a reasonable attempt to correct obvious errors, but I expect that there are errors of grammar and possibly content that I did not discover before finalizing the note.    This note was electronically signed by Shadi Nicole PA-C